# Patient Record
Sex: FEMALE | Race: WHITE | NOT HISPANIC OR LATINO | Employment: UNEMPLOYED | URBAN - METROPOLITAN AREA
[De-identification: names, ages, dates, MRNs, and addresses within clinical notes are randomized per-mention and may not be internally consistent; named-entity substitution may affect disease eponyms.]

---

## 2017-12-28 ENCOUNTER — TELEPHONE (OUTPATIENT)
Dept: OBSTETRICS AND GYNECOLOGY | Facility: CLINIC | Age: 27
End: 2017-12-28

## 2017-12-28 NOTE — TELEPHONE ENCOUNTER
----- Message from Frederic Magana sent at 12/28/2017 11:28 AM CST -----  Contact: patients  Amy  Patient is requesting a call back to schedule initial OB appointment.    Who Called: Mally Reyes    Date of Positive Preg Test:  8/17    1st day of Last Menstrual Cycle:  Unknown    List Any Difficulties: High BP     What Number to Call Back:  [phone]    Pt should expect a return call by the end of the day.

## 2017-12-28 NOTE — TELEPHONE ENCOUNTER
Pt reports a 24 week pregnancy with HTN, pt states they are here visiting until Monday but has been very sick shew as tested for the flu, test was negative. Pt was informed that unfortunately  is out of the office until next week. Pt was informed of the OB/ED and that she could go there to be seen for her concerns of severe headache. Pt stated that she understood and would be fine with going there.

## 2017-12-29 ENCOUNTER — HOSPITAL ENCOUNTER (EMERGENCY)
Facility: OTHER | Age: 27
Discharge: HOME | End: 2017-12-29
Attending: OBSTETRICS & GYNECOLOGY
Payer: COMMERCIAL

## 2017-12-29 VITALS
OXYGEN SATURATION: 98 % | SYSTOLIC BLOOD PRESSURE: 112 MMHG | TEMPERATURE: 98 F | HEART RATE: 83 BPM | RESPIRATION RATE: 18 BRPM | DIASTOLIC BLOOD PRESSURE: 63 MMHG

## 2017-12-29 DIAGNOSIS — O10.919 CHRONIC HYPERTENSION AFFECTING PREGNANCY: ICD-10-CM

## 2017-12-29 DIAGNOSIS — Z3A.24 24 WEEKS GESTATION OF PREGNANCY: ICD-10-CM

## 2017-12-29 DIAGNOSIS — J06.9 VIRAL URI WITH COUGH: Primary | ICD-10-CM

## 2017-12-29 DIAGNOSIS — J10.1 INFLUENZA B: ICD-10-CM

## 2017-12-29 LAB
ALBUMIN SERPL BCP-MCNC: 3 G/DL
ALP SERPL-CCNC: 76 U/L
ALT SERPL W/O P-5'-P-CCNC: 19 U/L
ANION GAP SERPL CALC-SCNC: 10 MMOL/L
AST SERPL-CCNC: 20 U/L
BACTERIA #/AREA URNS HPF: ABNORMAL /HPF
BASOPHILS # BLD AUTO: 0.01 K/UL
BASOPHILS NFR BLD: 0.1 %
BILIRUB SERPL-MCNC: 0.3 MG/DL
BILIRUB UR QL STRIP: NEGATIVE
BUN SERPL-MCNC: 6 MG/DL
CALCIUM SERPL-MCNC: 8.5 MG/DL
CHLORIDE SERPL-SCNC: 106 MMOL/L
CLARITY UR: CLEAR
CO2 SERPL-SCNC: 21 MMOL/L
COLOR UR: YELLOW
CREAT SERPL-MCNC: 0.6 MG/DL
CREAT UR-MCNC: 185.5 MG/DL
DIFFERENTIAL METHOD: ABNORMAL
EOSINOPHIL # BLD AUTO: 0 K/UL
EOSINOPHIL NFR BLD: 0.4 %
ERYTHROCYTE [DISTWIDTH] IN BLOOD BY AUTOMATED COUNT: 14.3 %
EST. GFR  (AFRICAN AMERICAN): >60 ML/MIN/1.73 M^2
EST. GFR  (NON AFRICAN AMERICAN): >60 ML/MIN/1.73 M^2
FLUAV AG SPEC QL IA: NEGATIVE
FLUBV AG SPEC QL IA: POSITIVE
GLUCOSE SERPL-MCNC: 85 MG/DL
GLUCOSE UR QL STRIP: NEGATIVE
HCT VFR BLD AUTO: 32.6 %
HGB BLD-MCNC: 11 G/DL
HGB UR QL STRIP: NEGATIVE
KETONES UR QL STRIP: NEGATIVE
LEUKOCYTE ESTERASE UR QL STRIP: ABNORMAL
LYMPHOCYTES # BLD AUTO: 1.6 K/UL
LYMPHOCYTES NFR BLD: 17.4 %
MCH RBC QN AUTO: 30.8 PG
MCHC RBC AUTO-ENTMCNC: 33.7 G/DL
MCV RBC AUTO: 91 FL
MICROSCOPIC COMMENT: ABNORMAL
MONOCYTES # BLD AUTO: 0.6 K/UL
MONOCYTES NFR BLD: 6.7 %
NEUTROPHILS # BLD AUTO: 7 K/UL
NEUTROPHILS NFR BLD: 74.7 %
NITRITE UR QL STRIP: NEGATIVE
PH UR STRIP: 7 [PH] (ref 5–8)
PLATELET # BLD AUTO: 153 K/UL
PMV BLD AUTO: 10.9 FL
POTASSIUM SERPL-SCNC: 3.5 MMOL/L
PROT SERPL-MCNC: 6.4 G/DL
PROT UR QL STRIP: ABNORMAL
PROT UR-MCNC: 25 MG/DL
PROT/CREAT RATIO, UR: 0.13
RBC # BLD AUTO: 3.57 M/UL
RBC #/AREA URNS HPF: 2 /HPF (ref 0–4)
SODIUM SERPL-SCNC: 137 MMOL/L
SP GR UR STRIP: 1.02 (ref 1–1.03)
SPECIMEN SOURCE: ABNORMAL
SQUAMOUS #/AREA URNS HPF: 9 /HPF
URN SPEC COLLECT METH UR: ABNORMAL
UROBILINOGEN UR STRIP-ACNC: NEGATIVE EU/DL
WBC # BLD AUTO: 9.39 K/UL
WBC #/AREA URNS HPF: 5 /HPF (ref 0–5)
YEAST URNS QL MICRO: ABNORMAL

## 2017-12-29 PROCEDURE — 85025 COMPLETE CBC W/AUTO DIFF WBC: CPT

## 2017-12-29 PROCEDURE — 82570 ASSAY OF URINE CREATININE: CPT

## 2017-12-29 PROCEDURE — 25000003 PHARM REV CODE 250: Performed by: STUDENT IN AN ORGANIZED HEALTH CARE EDUCATION/TRAINING PROGRAM

## 2017-12-29 PROCEDURE — 87400 INFLUENZA A/B EACH AG IA: CPT | Mod: 59

## 2017-12-29 PROCEDURE — 99284 EMERGENCY DEPT VISIT MOD MDM: CPT | Mod: 25

## 2017-12-29 PROCEDURE — 81000 URINALYSIS NONAUTO W/SCOPE: CPT

## 2017-12-29 PROCEDURE — 99284 EMERGENCY DEPT VISIT MOD MDM: CPT | Mod: 25,,, | Performed by: OBSTETRICS & GYNECOLOGY

## 2017-12-29 PROCEDURE — 59025 FETAL NON-STRESS TEST: CPT

## 2017-12-29 PROCEDURE — 59025 FETAL NON-STRESS TEST: CPT | Mod: 26,,, | Performed by: OBSTETRICS & GYNECOLOGY

## 2017-12-29 PROCEDURE — 80053 COMPREHEN METABOLIC PANEL: CPT

## 2017-12-29 RX ORDER — OSELTAMIVIR PHOSPHATE 75 MG/1
75 CAPSULE ORAL 2 TIMES DAILY
Qty: 10 CAPSULE | Refills: 0 | Status: SHIPPED | OUTPATIENT
Start: 2017-12-29 | End: 2018-01-03

## 2017-12-29 RX ORDER — BENZONATATE 100 MG/1
100 CAPSULE ORAL ONCE
Status: COMPLETED | OUTPATIENT
Start: 2017-12-29 | End: 2017-12-29

## 2017-12-29 RX ORDER — ONDANSETRON 8 MG/1
8 TABLET, ORALLY DISINTEGRATING ORAL ONCE
Status: COMPLETED | OUTPATIENT
Start: 2017-12-29 | End: 2017-12-29

## 2017-12-29 RX ORDER — ONDANSETRON 4 MG/1
4 TABLET, FILM COATED ORAL EVERY 8 HOURS PRN
Qty: 60 TABLET | Refills: 3 | Status: SHIPPED | OUTPATIENT
Start: 2017-12-29 | End: 2018-12-29

## 2017-12-29 RX ADMIN — ONDANSETRON 8 MG: 8 TABLET, ORALLY DISINTEGRATING ORAL at 12:12

## 2017-12-29 RX ADMIN — BENZONATATE 100 MG: 100 CAPSULE ORAL at 12:12

## 2017-12-29 NOTE — ED PROVIDER NOTES
Encounter Date: 2017       History     Chief Complaint   Patient presents with    Hypertension     Jane Stevens is a 27 y.o.  at 24w2d who presents with complaints of headache & upper respiratory complaints. The patient has a history of chronic hypertension not requiring medication.  The patient states that she saw Dr. Nickerson during her first pregnancy and her labor was induced at 38 weeks after having persistent elevated pressures.  She has since moved to [place] where she is receiving care for her second pregnancy.  She was started on antihypertensive medication at 16 weeks gestation after having persistently elevated pressures and headaches.  Since that time her medication regimen has changed from labetalol to nifedipine to Methyldopa which she is currently taking at a dosage of 250 TID.  She states that her current symptoms started a day and a half ago with a headache.  She states that the headache is accompanied by upper respiratory complaints including congestion and a productive cough.  She has also had nausea and vomiting but denies fevers/chills or urinary complaints. She currently denies any headache, vision changes, RUQ Pain.  She did vomit once the OB ED exam room.  She denies any obstetric complaints including vaginal bleeding, leaking fluids or vaginal bleeding.  Fetal movements have been active.     This IUP is complicated by chronic hypertension and asthma.           Review of patient's allergies indicates:  No Known Allergies  Past Medical History:   Diagnosis Date    Asthma     Hypertension      No past surgical history on file.  Family History   Problem Relation Age of Onset    Breast cancer Neg Hx     Colon cancer Neg Hx     Ovarian cancer Neg Hx      Social History   Substance Use Topics    Smoking status: Never Smoker    Smokeless tobacco: Never Used    Alcohol use No     Review of Systems   Constitutional: Negative for chills and fever.   HENT: Positive for  congestion and postnasal drip. Negative for sore throat.    Eyes: Negative for visual disturbance.   Respiratory: Positive for cough (Productive) and shortness of breath (Mild).    Cardiovascular: Negative for chest pain.   Gastrointestinal: Positive for nausea and vomiting. Negative for anal bleeding.   Genitourinary: Negative for dysuria, hematuria, vaginal bleeding and vaginal discharge.   Musculoskeletal: Negative for back pain.   Skin: Negative for rash.   Neurological: Negative for weakness and headaches.   Hematological: Does not bruise/bleed easily.       Physical Exam     Initial Vitals   BP Pulse Resp Temp SpO2   12/29/17 1135 12/29/17 1134 12/29/17 1134 12/29/17 1134 12/29/17 1135   121/85 106 18 98 °F (36.7 °C) 100 %      MAP       12/29/17 1135       97         Physical Exam    Vitals reviewed.  Constitutional: She appears well-developed and well-nourished. She is not diaphoretic. No distress.   Pregnant female in no acute distress.  The Patient appears as if she doesn't feel well. Sitting with a vomit basin in bed.    HENT:   Head: Normocephalic and atraumatic.   Eyes: Conjunctivae are normal.   Neck: Neck supple.   Cardiovascular: Normal rate, regular rhythm and intact distal pulses.   Pulmonary/Chest: Breath sounds normal. No respiratory distress. She has no wheezes. She has no rhonchi. She has no rales.   Abdominal: She exhibits distension. There is no tenderness. There is no rebound and no guarding.   Genitourinary:   Genitourinary Comments: NO CVA Tenderness.   Neurological: She is alert and oriented to person, place, and time.   Skin: Skin is warm and dry.   Psychiatric: She has a normal mood and affect. Her behavior is normal. Judgment and thought content normal.         ED Course   Obtain Fetal nonstress test (NST)  Date/Time: 12/29/2017 12:20 PM  Performed by: SANTIAGO MAC  Authorized by: CONNOR DIAS     Nonstress Test:     Variability:  6-25 BPM    Decelerations:  None     Accelerations:  10 bpm    Baseline:  145    Contractions:  Not present  Biophysical Profile:     Nonstress Test Interpretation: reactive      Overall Impression:  Reassuring        Labs Reviewed   COMPREHENSIVE METABOLIC PANEL   CBC W/ AUTO DIFFERENTIAL   PROTEIN / CREATININE RATIO, URINE   URINALYSIS             Medical Decision Making:   ED Management:  Teslon Pearls for Cough  Zofran for Nausea  PC Ratio - 0.13  CBC - Without clinically significant findings  CMP - Without clinically significant findings  Flu Swab - Positive Influenza B                Attending Attestation:   Physician Attestation Statement for Resident:  As the supervising MD   Physician Attestation Statement: I have personally seen and examined this patient.   I agree with the above history. -:   As the supervising MD I agree with the above PE.    As the supervising MD I agree with the above treatment, course, plan, and disposition.   -:   NST  I independently reviewed the fetal non-stress test with the following interpretation:  145 BPM baseline  Variability: moderate  Accelerations: present  Decelerations: absent  Contractions: none  Category 1    Clinical Interpretation:age appropriate    Patient evaluated and found to be stable, agree with resident's assessment of influenza in pregnancy and plan to discharge with precautions and supportive measures. Her family was urged to consider vaccination.  I was personally present during the critical portions of the procedure(s) performed by the resident and was immediately available in the ED to provide services and assistance as needed during the entire procedure.  I have reviewed and agree with the residents interpretation of the following: lab data.                    ED Course      Clinical Impression:   The primary encounter diagnosis was Viral URI with cough. Diagnoses of Chronic hypertension affecting pregnancy, 24 weeks gestation of pregnancy, and Influenza B were also pertinent to this  visit.       - Patient diagnosed with Influenza B   - Tamiflu sent to the pharmacy  - Instructed Patient to contact primary care providers for  and daughter regarding prophylactic treatment  - BPs well controlled  - Patient asymptomatic   - PC Ratio - 0.13  - Continue taking Methyldopa 250 TID  - No acute obstetric intervention required at this time.  - Obstetric work-up reassuring  - Patient to follow-up with Primary OBGYN as scheduled      Ksenia Bailey M.D.  PGY1 OB/GYN                 Ksenia Fam MD  Resident  12/29/17 154       Yancy Putnam MD  12/29/17 [id number]

## 2017-12-29 NOTE — NURSING
"Pt ambulated here with c/o "Not feeling good."  States she has had hypertension "on and off" during this pregnancy and also with her previous pregnancy.  Pt states she has a respiratory virus right now, but was swabbed for the flu yesterday, and the result was negative.  Has not been followed by a local physician, as she now lives in [facility].  Was seen by Dr. Nickerson with her previous pregnancy.  "

## 2017-12-29 NOTE — DISCHARGE INSTRUCTIONS

## 2017-12-29 NOTE — NURSING
Pt given d/c instructions; will  Rx for Tamiflu and Zofran at [facility] pharmacy in [facility].  Pt verbalizes understanding of both verbal and written d/c instructions.

## 2023-08-17 PROBLEM — T83.32XA IUD THREADS LOST: Status: ACTIVE | Noted: 2023-08-17

## 2023-08-17 PROBLEM — Z01.419 ENCOUNTER FOR WELL WOMAN EXAM WITH ROUTINE GYNECOLOGICAL EXAM: Status: ACTIVE | Noted: 2023-08-17

## 2024-10-16 ENCOUNTER — APPOINTMENT (RX ONLY)
Dept: URBAN - NONMETROPOLITAN AREA CLINIC 13 | Facility: CLINIC | Age: 34
Setting detail: DERMATOLOGY
End: 2024-10-16

## 2024-10-16 DIAGNOSIS — L71.8 OTHER ROSACEA: ICD-10-CM | Status: INADEQUATELY CONTROLLED

## 2024-10-16 PROCEDURE — 99203 OFFICE O/P NEW LOW 30 MIN: CPT

## 2024-10-16 PROCEDURE — ? COUNSELING

## 2024-10-16 PROCEDURE — ? TREATMENT REGIMEN

## 2024-10-16 ASSESSMENT — LOCATION SIMPLE DESCRIPTION DERM: LOCATION SIMPLE: RIGHT CHEEK

## 2024-10-16 ASSESSMENT — LOCATION ZONE DERM: LOCATION ZONE: FACE

## 2024-10-16 ASSESSMENT — LOCATION DETAILED DESCRIPTION DERM: LOCATION DETAILED: RIGHT INFERIOR CENTRAL MALAR CHEEK

## 2024-10-16 NOTE — PROCEDURE: TREATMENT REGIMEN
Plan: discussed triple gel vs VBEAM\\npt declined triple gel at this time\\nwill schedule VBEAM with spa\\nfollow up for FBSE\\nanswered all questions and concerns
Detail Level: Zone

## 2024-10-16 NOTE — HPI: RASH (ROSACEA)
How Severe Is Your Rosacea?: moderate
Is This A New Presentation, Or A Follow-Up?: Rosacea
Additional History: Pt reports for Rosacea today, only concern.